# Patient Record
Sex: FEMALE | Race: WHITE | NOT HISPANIC OR LATINO | URBAN - METROPOLITAN AREA
[De-identification: names, ages, dates, MRNs, and addresses within clinical notes are randomized per-mention and may not be internally consistent; named-entity substitution may affect disease eponyms.]

---

## 2020-06-10 ENCOUNTER — OFFICE VISIT (OUTPATIENT)
Dept: URBAN - METROPOLITAN AREA CLINIC 113 | Facility: CLINIC | Age: 80
End: 2020-06-10

## 2020-07-25 ENCOUNTER — TELEPHONE ENCOUNTER (OUTPATIENT)
Dept: URBAN - METROPOLITAN AREA CLINIC 13 | Facility: CLINIC | Age: 80
End: 2020-07-25

## 2020-07-25 RX ORDER — BIFIDOBACTERIUM LONGUM 10MM CELL
TAKE 1 CAPSULE DAILY CAPSULE ORAL
Refills: 0 | OUTPATIENT
Start: 2011-03-15 | End: 2011-04-25

## 2020-07-25 RX ORDER — PRAVASTATIN SODIUM 20 MG/1
TAKE 1 TABLET DAILY TABLET ORAL
Refills: 0 | OUTPATIENT
End: 2015-04-09

## 2020-07-25 RX ORDER — DICYCLOMINE HYDROCHLORIDE 10 MG/1
TAKE 1 CAPSULE EVERY 6 HOURS PRN ABDOMINAL PAIN CAPSULE ORAL
Qty: 60 | Refills: 3 | OUTPATIENT
Start: 2016-04-01 | End: 2018-04-11

## 2020-07-25 RX ORDER — CALCIUM CARBONATE 500 MG/1
TAKE  TABLET AS NEEDED PRN TABLET, CHEWABLE ORAL
Refills: 0 | OUTPATIENT
Start: 2009-05-11 | End: 2011-03-09

## 2020-07-25 RX ORDER — HYDROCODONE BITARTRATE AND ACETAMINOPHEN 5; 325 MG/1; MG/1
TABLET ORAL
Qty: 45 | Refills: 0 | OUTPATIENT
Start: 2014-11-18 | End: 2015-09-14

## 2020-07-25 RX ORDER — OMEPRAZOLE 40 MG/1
TAKE ONE CAPSULE BY MOUTH EVERY DAY CAPSULE, DELAYED RELEASE ORAL
Qty: 30 | Refills: 6 | OUTPATIENT
Start: 2019-02-18 | End: 2019-06-27

## 2020-07-25 RX ORDER — POLYETHYLENE GLYCOL 3350, SODIUM CHLORIDE, SODIUM BICARBONATE AND POTASSIUM CHLORIDE WITH LEMON FLAVOR 420; 11.2; 5.72; 1.48 G/4L; G/4L; G/4L; G/4L
TAKE 1/2 GALLON AT 5:00 PM DAY BEFORE PROCEDURE, TAKE SECOND 1/2 OF GALLON 6 HRS PRIOR TO PROCEDURE POWDER, FOR SOLUTION ORAL
Qty: 1 | Refills: 0 | OUTPATIENT
Start: 2018-09-24 | End: 2018-10-08

## 2020-07-25 RX ORDER — ALLOPURINOL 300 MG/1
TAKE 1 TABLET DAILY AS DIRECTED TABLET ORAL
Refills: 0 | OUTPATIENT
End: 2018-07-03

## 2020-07-25 RX ORDER — PRAVASTATIN SODIUM 40 MG/1
TAKE 1 TABLET DAILY TABLET ORAL
Refills: 0 | OUTPATIENT
Start: 2014-05-08 | End: 2019-02-18

## 2020-07-25 RX ORDER — CIPROFLOXACIN HCL 250 MG
TAKE 1 TABLET TWICE DAILY TABLET ORAL
Qty: 20 | Refills: 1 | OUTPATIENT
End: 2018-10-08

## 2020-07-25 RX ORDER — ONDANSETRON 4 MG/1
TAKE 1 TABLET EVERY 8 HOURS PRN NAUSEA TABLET, ORALLY DISINTEGRATING ORAL
Qty: 20 | Refills: 1 | OUTPATIENT
Start: 2018-03-20 | End: 2018-04-16

## 2020-07-25 RX ORDER — DONEPEZIL HYDROCHLORIDE 10 MG/1
TAKE 1 TABLET DAILY AS DIRECTED TABLET, FILM COATED ORAL
Refills: 0 | OUTPATIENT
Start: 2019-05-21 | End: 2020-06-10

## 2020-07-25 RX ORDER — CLONAZEPAM 0.5 MG/1
TAKE 1/2 TO 1 TABLET AT BEDTIME AS NEEDED TABLET ORAL
Refills: 0 | OUTPATIENT
End: 2019-02-18

## 2020-07-25 RX ORDER — DICYCLOMINE HYDROCHLORIDE 10 MG/1
TAKE 1 CAPSULE EVERY 6 HOURS PRN ABDOMINAL PAIN CAPSULE ORAL
Qty: 60 | Refills: 3 | OUTPATIENT
Start: 2018-05-14 | End: 2019-02-18

## 2020-07-25 RX ORDER — CIPROFLOXACIN HYDROCHLORIDE 500 MG/1
TAKE 1 TABLET TWICE DAILY FOR 10 DAYS TABLET, FILM COATED ORAL
Qty: 20 | Refills: 0 | OUTPATIENT
End: 2019-06-27

## 2020-07-25 RX ORDER — CLONAZEPAM 0.5 MG/1
TAKE 1 TABLET 3 TIMES DAILY AS NEEDED TABLET ORAL
Refills: 0 | OUTPATIENT
Start: 2014-05-08 | End: 2018-03-20

## 2020-07-25 RX ORDER — OMEPRAZOLE 40 MG/1
TAKE (1) CAPSULE BY MOUTH DAILY CAPSULE, DELAYED RELEASE ORAL
Qty: 30 | Refills: 3 | OUTPATIENT
Start: 2015-04-14 | End: 2015-09-14

## 2020-07-25 RX ORDER — COLESEVELAM HYDROCHLORIDE 625 MG/1
TAKE 1 TABLET DAILY TABLET, FILM COATED ORAL
Qty: 30 | Refills: 3 | OUTPATIENT
Start: 2018-05-14 | End: 2019-02-18

## 2020-07-25 RX ORDER — BUSPIRONE HYDROCHLORIDE 5 MG/1
TAKE 1 TABLET TWICE DAILY TABLET ORAL
Qty: 60 | Refills: 3 | OUTPATIENT
Start: 2018-05-14 | End: 2019-02-18

## 2020-07-25 RX ORDER — ONDANSETRON 4 MG/1
TAKE 1 TABLET EVERY 8 HOURS PRN NAUSEA TABLET, ORALLY DISINTEGRATING ORAL
Qty: 28 | Refills: 3 | OUTPATIENT
Start: 2019-09-26 | End: 2020-06-10

## 2020-07-25 RX ORDER — METRONIDAZOLE 500 MG/1
TAKE 1 TABLET 3 TIMES DAILY FOR 10 DAYS TABLET ORAL
Qty: 30 | Refills: 0 | OUTPATIENT
Start: 2014-10-17 | End: 2014-11-13

## 2020-07-26 ENCOUNTER — TELEPHONE ENCOUNTER (OUTPATIENT)
Dept: URBAN - METROPOLITAN AREA CLINIC 13 | Facility: CLINIC | Age: 80
End: 2020-07-26

## 2020-07-26 RX ORDER — TRAMADOL HYDROCHLORIDE 50 MG/1
TABLET ORAL
Qty: 30 | Refills: 0 | Status: ACTIVE | COMMUNITY
Start: 2018-04-21

## 2020-07-26 RX ORDER — COLESEVELAM HYDROCHLORIDE 625 MG/1
TABLET, FILM COATED ORAL
Qty: 60 | Refills: 0 | Status: ACTIVE | COMMUNITY
Start: 2015-02-23

## 2020-07-26 RX ORDER — TRIAMCINOLONE ACETONIDE 1 MG/G
CREAM TOPICAL
Qty: 30 | Refills: 0 | Status: ACTIVE | COMMUNITY
Start: 2014-06-12

## 2020-07-26 RX ORDER — FLUCONAZOLE 150 MG/1
TABLET ORAL
Qty: 2 | Refills: 0 | Status: ACTIVE | COMMUNITY
Start: 2018-09-13

## 2020-07-26 RX ORDER — ROSUVASTATIN CALCIUM 10 MG/1
TABLET, FILM COATED ORAL
Qty: 90 | Refills: 0 | Status: ACTIVE | COMMUNITY
Start: 2018-10-30

## 2020-07-26 RX ORDER — SUCRALFATE 1 G/10ML
SUSPENSION ORAL
Qty: 600 | Refills: 0 | Status: ACTIVE | COMMUNITY
Start: 2019-08-08

## 2020-07-26 RX ORDER — AMOXICILLIN 500 MG/1
CAPSULE ORAL
Qty: 56 | Refills: 0 | Status: ACTIVE | COMMUNITY
Start: 2018-03-12

## 2020-07-26 RX ORDER — CIPROFLOXACIN HYDROCHLORIDE 500 MG/1
TABLET, FILM COATED ORAL
Qty: 20 | Refills: 0 | Status: ACTIVE | COMMUNITY
Start: 2014-02-14

## 2020-07-26 RX ORDER — CIPROFLOXACIN HYDROCHLORIDE 500 MG/1
TABLET, FILM COATED ORAL
Qty: 20 | Refills: 0 | Status: ACTIVE | COMMUNITY
Start: 2018-02-27

## 2020-07-26 RX ORDER — AMLODIPINE BESYLATE 2.5 MG/1
TABLET ORAL
Qty: 30 | Refills: 0 | Status: ACTIVE | COMMUNITY
Start: 2019-07-25

## 2020-07-26 RX ORDER — FLUCONAZOLE 150 MG/1
TABLET ORAL
Qty: 7 | Refills: 0 | Status: ACTIVE | COMMUNITY
Start: 2014-08-25

## 2020-07-26 RX ORDER — ALLOPURINOL 100 MG/1
TABLET ORAL
Qty: 180 | Refills: 0 | Status: ACTIVE | COMMUNITY
Start: 2018-03-06

## 2020-07-26 RX ORDER — OMEPRAZOLE 20 MG/1
CAPSULE, DELAYED RELEASE ORAL
Qty: 30 | Refills: 0 | Status: ACTIVE | COMMUNITY
Start: 2018-05-08

## 2020-07-26 RX ORDER — NITROFURANTOIN MONOHYDRATE/MACROCRYSTALLINE 25; 75 MG/1; MG/1
CAPSULE ORAL
Qty: 14 | Refills: 0 | Status: ACTIVE | COMMUNITY
Start: 2018-09-05

## 2020-07-26 RX ORDER — ALBUTEROL SULFATE 90 UG/1
AEROSOL, METERED RESPIRATORY (INHALATION)
Qty: 85 | Refills: 0 | Status: ACTIVE | COMMUNITY
Start: 2019-05-21

## 2020-07-26 RX ORDER — PRAVASTATIN SODIUM 40 MG/1
TABLET ORAL
Qty: 90 | Refills: 0 | Status: ACTIVE | COMMUNITY
Start: 2014-05-08

## 2020-07-26 RX ORDER — ASPIRIN 81 MG/1
TAKE 1 TABLET DAILY TABLET, DELAYED RELEASE ORAL
Refills: 0 | Status: ACTIVE | COMMUNITY

## 2020-07-26 RX ORDER — ONDANSETRON HYDROCHLORIDE 4 MG/1
TABLET, FILM COATED ORAL
Qty: 45 | Refills: 0 | Status: ACTIVE | COMMUNITY
Start: 2014-11-18

## 2020-07-26 RX ORDER — SUCRALFATE 1 G/1
TABLET ORAL
Qty: 90 | Refills: 0 | Status: ACTIVE | COMMUNITY
Start: 2018-03-06

## 2020-07-26 RX ORDER — TAMSULOSIN HYDROCHLORIDE 0.4 MG/1
CAPSULE ORAL
Qty: 90 | Refills: 0 | Status: ACTIVE | COMMUNITY
Start: 2018-10-16

## 2020-07-26 RX ORDER — PENICILLIN V POTASSIUM 500 MG/1
TABLET, FILM COATED ORAL
Qty: 28 | Refills: 0 | Status: ACTIVE | COMMUNITY
Start: 2018-09-27

## 2020-07-26 RX ORDER — CEPHALEXIN 500 MG/1
CAPSULE ORAL
Qty: 30 | Refills: 0 | Status: ACTIVE | COMMUNITY
Start: 2018-09-20

## 2020-07-26 RX ORDER — FLUCONAZOLE 100 MG/1
TABLET ORAL
Qty: 7 | Refills: 0 | Status: ACTIVE | COMMUNITY
Start: 2018-09-20

## 2020-07-26 RX ORDER — NITROFURANTOIN MONOHYDRATE/MACROCRYSTALLINE 25; 75 MG/1; MG/1
CAPSULE ORAL
Qty: 14 | Refills: 0 | Status: ACTIVE | COMMUNITY
Start: 2019-04-15

## 2020-07-26 RX ORDER — HYDROCODONE BITARTRATE AND ACETAMINOPHEN 5; 325 MG/1; MG/1
TABLET ORAL
Qty: 45 | Refills: 0 | Status: ACTIVE | COMMUNITY
Start: 2014-11-18

## 2020-07-26 RX ORDER — BETHANECHOL CHLORIDE 25 MG/1
TABLET ORAL
Qty: 60 | Refills: 0 | Status: ACTIVE | COMMUNITY
Start: 2014-10-02

## 2020-07-26 RX ORDER — GABAPENTIN 100 MG/1
CAPSULE ORAL
Qty: 30 | Refills: 0 | Status: ACTIVE | COMMUNITY
Start: 2018-05-17

## 2020-07-26 RX ORDER — BUSPIRONE HYDROCHLORIDE 5 MG/1
TAKE 1 TABLET TWICE DAILY TABLET ORAL
Qty: 60 | Refills: 5 | Status: ACTIVE | COMMUNITY
Start: 2019-09-26

## 2020-07-26 RX ORDER — QUETIAPINE FUMARATE 25 MG/1
TABLET, FILM COATED ORAL
Qty: 30 | Refills: 0 | Status: ACTIVE | COMMUNITY
Start: 2018-12-04

## 2020-07-26 RX ORDER — CLARITHROMYCIN 500 MG/1
TABLET, FILM COATED ORAL
Qty: 28 | Refills: 0 | Status: ACTIVE | COMMUNITY
Start: 2018-03-12

## 2020-07-26 RX ORDER — ONDANSETRON HYDROCHLORIDE 4 MG/1
TABLET, FILM COATED ORAL
Qty: 15 | Refills: 0 | Status: ACTIVE | COMMUNITY
Start: 2018-03-27

## 2020-07-26 RX ORDER — QUETIAPINE FUMARATE 25 MG/1
TABLET, FILM COATED ORAL
Qty: 90 | Refills: 0 | Status: ACTIVE | COMMUNITY
Start: 2019-01-03

## 2020-07-26 RX ORDER — CEPHALEXIN 500 MG/1
CAPSULE ORAL
Qty: 28 | Refills: 0 | Status: ACTIVE | COMMUNITY
Start: 2014-08-05

## 2020-07-26 RX ORDER — ATORVASTATIN CALCIUM 80 MG/1
TAKE 1 TABLET DAILY TABLET, FILM COATED ORAL
Refills: 0 | Status: ACTIVE | COMMUNITY
Start: 2018-12-04

## 2020-07-26 RX ORDER — CEPHALEXIN 250 MG/1
CAPSULE ORAL
Qty: 50 | Refills: 0 | Status: ACTIVE | COMMUNITY
Start: 2014-02-20

## 2020-07-26 RX ORDER — CLONAZEPAM 0.5 MG/1
TABLET ORAL
Qty: 60 | Refills: 0 | Status: ACTIVE | COMMUNITY
Start: 2014-01-08

## 2020-07-26 RX ORDER — AMOXICILLIN AND CLAVULANATE POTASSIUM 875; 125 MG/1; MG/1
TABLET, FILM COATED ORAL
Qty: 14 | Refills: 0 | Status: ACTIVE | COMMUNITY
Start: 2014-04-10

## 2020-07-26 RX ORDER — OMEPRAZOLE 20 MG/1
TAKE 1 CAPSULE BY MOUTH ONCE DAILY CAPSULE, DELAYED RELEASE ORAL
Qty: 30 | Refills: 0 | Status: ACTIVE | COMMUNITY
Start: 2019-08-08

## 2020-08-27 ENCOUNTER — OFFICE VISIT (OUTPATIENT)
Dept: URBAN - METROPOLITAN AREA CLINIC 113 | Facility: CLINIC | Age: 80
End: 2020-08-27
Payer: MEDICARE

## 2020-08-27 VITALS
SYSTOLIC BLOOD PRESSURE: 97 MMHG | TEMPERATURE: 97.2 F | HEIGHT: 66 IN | HEART RATE: 65 BPM | DIASTOLIC BLOOD PRESSURE: 59 MMHG | RESPIRATION RATE: 20 BRPM | BODY MASS INDEX: 22.5 KG/M2 | WEIGHT: 140 LBS

## 2020-08-27 DIAGNOSIS — R11.0 NAUSEA: ICD-10-CM

## 2020-08-27 PROCEDURE — 99213 OFFICE O/P EST LOW 20 MIN: CPT | Performed by: INTERNAL MEDICINE

## 2020-08-27 RX ORDER — ATORVASTATIN CALCIUM 80 MG/1
TAKE 1 TABLET DAILY TABLET, FILM COATED ORAL
Refills: 0 | Status: ACTIVE | COMMUNITY
Start: 2018-12-04

## 2020-08-27 RX ORDER — ALBUTEROL SULFATE 90 UG/1
AEROSOL, METERED RESPIRATORY (INHALATION)
Qty: 85 | Refills: 0 | Status: ACTIVE | COMMUNITY
Start: 2019-05-21

## 2020-08-27 RX ORDER — QUETIAPINE FUMARATE 25 MG/1
TABLET, FILM COATED ORAL
Qty: 30 | Refills: 0 | Status: ACTIVE | COMMUNITY
Start: 2018-12-04

## 2020-08-27 RX ORDER — BUSPIRONE HYDROCHLORIDE 5 MG/1
TAKE 1 TABLET TWICE DAILY TABLET ORAL
Qty: 60 | Refills: 5 | Status: ACTIVE | COMMUNITY
Start: 2019-09-26

## 2020-08-27 RX ORDER — ASPIRIN 81 MG/1
TAKE 1 TABLET DAILY TABLET, DELAYED RELEASE ORAL
Refills: 0 | Status: ACTIVE | COMMUNITY

## 2020-08-27 RX ORDER — ONDANSETRON HYDROCHLORIDE 8 MG/1
1 CAPSULE TABLET, FILM COATED ORAL
Qty: 90 | Refills: 0 | OUTPATIENT
Start: 2020-08-27 | End: 2020-09-26

## 2020-08-27 RX ORDER — TAMSULOSIN HYDROCHLORIDE 0.4 MG/1
CAPSULE ORAL
Qty: 90 | Refills: 0 | Status: ACTIVE | COMMUNITY
Start: 2018-10-16

## 2020-08-27 RX ORDER — ROSUVASTATIN CALCIUM 10 MG/1
TABLET, FILM COATED ORAL
Qty: 90 | Refills: 0 | Status: ACTIVE | COMMUNITY
Start: 2018-10-30

## 2020-08-27 RX ORDER — ALLOPURINOL 100 MG/1
1 TABLET TABLET ORAL ONCE A DAY
Refills: 0 | Status: ACTIVE | COMMUNITY
Start: 2018-03-06

## 2020-08-27 RX ORDER — SUCRALFATE 1 G/1
TABLET ORAL
Qty: 90 | Refills: 0 | Status: ACTIVE | COMMUNITY
Start: 2018-03-06

## 2020-08-27 RX ORDER — AMLODIPINE BESYLATE 2.5 MG/1
1 TABLET TABLET ORAL ONCE A DAY
Refills: 0 | Status: ACTIVE | COMMUNITY
Start: 2019-07-25

## 2020-08-27 NOTE — HPI-TODAY'S VISIT:
Ms. DUENAS is a 80 year old female with a history of colon cancer status post partial colectomy in 2003, GERD, constipation, nausea, and chronic abdominal pain, presenting for follow-up.  She was previously seen in Septmeber 2019 for follow-up regarding nausea. She was consistently taking omeprazole, and her PCP had added Carafate before meals. Morning nausea was persistent, improving and resolving after she was up moving about. She is to continue omeprazole, Zofran as needed and was to begin a trial of BuSpar. 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 Repeat MRI in 12 months to ensure stability.  At the time of her last visit here on 6/10/20, she continued with morning nausea that was persistent through lunch time. She could not do "anything" while the nausea is occurring. She reported upper abdominal discomfort described as a "hurt." Nausea seemed to improve with activity. She did not try buspirone as previously recommended. We recommended that she try this at the time of her last visit, along with FD Guard as needed.  The patient took the BuSpar after her last visit, but stopped it because "it did not help."  She continues to complain of nausea.  Typically, she stays in bed until after dinner every day.  She also complains of anhedonia, and feels as though she is going to die.  She has repetitively expressed concerns to her  that she will not live long enough to see her currently pregnant granddaughter give birth to her first great-granddaughter.  She recently had a worsening of her nausea after taking some Septra DS given to her for a urinary tract infection by Dr. Bland of urology.  She denies fever, chills, or sweats.  She actually has not had vomiting except in the context of taking the Septra.  She is convinced that her Synthroid is causing nausea, although she claims to have nausea constantly, and so she has not been taking her Synthroid.  Upon further questioning, the patient admits to depression, stating that this depression is profound.  Her , who accompanies her today, agrees.   Her weight is stable since her last visit in June 2020, although it is down 10 lbs over the past year.   Her last EGD was on 4/16/18 for nausea and epigastric pain and was normal.  Her last colonoscopy was on 9/26/18 and showed an intact anastomosis and left colon diverticulosis, but was otherwise normal.

## 2020-08-28 ENCOUNTER — TELEPHONE ENCOUNTER (OUTPATIENT)
Dept: URBAN - METROPOLITAN AREA CLINIC 113 | Facility: CLINIC | Age: 80
End: 2020-08-28

## 2020-08-28 ENCOUNTER — LAB OUTSIDE AN ENCOUNTER (OUTPATIENT)
Dept: URBAN - METROPOLITAN AREA CLINIC 113 | Facility: CLINIC | Age: 80
End: 2020-08-28

## 2020-09-16 ENCOUNTER — CLAIMS CREATED FROM THE CLAIM WINDOW (OUTPATIENT)
Dept: URBAN - METROPOLITAN AREA CLINIC 4 | Facility: CLINIC | Age: 80
End: 2020-09-16
Payer: MEDICARE

## 2020-09-16 ENCOUNTER — OFFICE VISIT (OUTPATIENT)
Dept: URBAN - METROPOLITAN AREA SURGERY CENTER 25 | Facility: SURGERY CENTER | Age: 80
End: 2020-09-16
Payer: MEDICARE

## 2020-09-16 DIAGNOSIS — R11.0 CHRONIC NAUSEA: ICD-10-CM

## 2020-09-16 DIAGNOSIS — K31.89 OTHER DISEASES OF STOMACH AND DUODENUM: ICD-10-CM

## 2020-09-16 PROCEDURE — G8907 PT DOC NO EVENTS ON DISCHARG: HCPCS | Performed by: INTERNAL MEDICINE

## 2020-09-16 PROCEDURE — 88312 SPECIAL STAINS GROUP 1: CPT | Performed by: PATHOLOGY

## 2020-09-16 PROCEDURE — 88305 TISSUE EXAM BY PATHOLOGIST: CPT | Performed by: PATHOLOGY

## 2020-09-16 PROCEDURE — 43239 EGD BIOPSY SINGLE/MULTIPLE: CPT | Performed by: INTERNAL MEDICINE

## 2020-09-16 RX ORDER — SUCRALFATE 1 G/1
TABLET ORAL
Qty: 90 | Refills: 0 | Status: ACTIVE | COMMUNITY
Start: 2018-03-06

## 2020-09-16 RX ORDER — ALBUTEROL SULFATE 90 UG/1
AEROSOL, METERED RESPIRATORY (INHALATION)
Qty: 85 | Refills: 0 | Status: ACTIVE | COMMUNITY
Start: 2019-05-21

## 2020-09-16 RX ORDER — TAMSULOSIN HYDROCHLORIDE 0.4 MG/1
CAPSULE ORAL
Qty: 90 | Refills: 0 | Status: ACTIVE | COMMUNITY
Start: 2018-10-16

## 2020-09-16 RX ORDER — ROSUVASTATIN CALCIUM 10 MG/1
TABLET, FILM COATED ORAL
Qty: 90 | Refills: 0 | Status: ACTIVE | COMMUNITY
Start: 2018-10-30

## 2020-09-16 RX ORDER — ASPIRIN 81 MG/1
TAKE 1 TABLET DAILY TABLET, DELAYED RELEASE ORAL
Refills: 0 | Status: ACTIVE | COMMUNITY

## 2020-09-16 RX ORDER — AMLODIPINE BESYLATE 2.5 MG/1
1 TABLET TABLET ORAL ONCE A DAY
Refills: 0 | Status: ACTIVE | COMMUNITY
Start: 2019-07-25

## 2020-09-16 RX ORDER — BUSPIRONE HYDROCHLORIDE 5 MG/1
TAKE 1 TABLET TWICE DAILY TABLET ORAL
Qty: 60 | Refills: 5 | Status: ACTIVE | COMMUNITY
Start: 2019-09-26

## 2020-09-16 RX ORDER — ALLOPURINOL 100 MG/1
1 TABLET TABLET ORAL ONCE A DAY
Refills: 0 | Status: ACTIVE | COMMUNITY
Start: 2018-03-06

## 2020-09-16 RX ORDER — ATORVASTATIN CALCIUM 80 MG/1
TAKE 1 TABLET DAILY TABLET, FILM COATED ORAL
Refills: 0 | Status: ACTIVE | COMMUNITY
Start: 2018-12-04

## 2020-09-16 RX ORDER — QUETIAPINE FUMARATE 25 MG/1
TABLET, FILM COATED ORAL
Qty: 30 | Refills: 0 | Status: ACTIVE | COMMUNITY
Start: 2018-12-04

## 2020-09-16 RX ORDER — ONDANSETRON HYDROCHLORIDE 8 MG/1
1 CAPSULE TABLET, FILM COATED ORAL
Qty: 90 | Refills: 0 | Status: ACTIVE | COMMUNITY
Start: 2020-08-27 | End: 2020-09-26

## 2020-12-08 ENCOUNTER — OFFICE VISIT (OUTPATIENT)
Dept: URBAN - METROPOLITAN AREA CLINIC 113 | Facility: CLINIC | Age: 80
End: 2020-12-08
Payer: MEDICARE

## 2020-12-08 VITALS
HEIGHT: 66 IN | RESPIRATION RATE: 20 BRPM | BODY MASS INDEX: 22.18 KG/M2 | HEART RATE: 66 BPM | TEMPERATURE: 98 F | DIASTOLIC BLOOD PRESSURE: 70 MMHG | WEIGHT: 138 LBS | SYSTOLIC BLOOD PRESSURE: 131 MMHG

## 2020-12-08 DIAGNOSIS — R93.5 ABNORMAL MRI OF ABDOMEN: ICD-10-CM

## 2020-12-08 DIAGNOSIS — K31.89 OTHER DISEASES OF STOMACH AND DUODENUM: ICD-10-CM

## 2020-12-08 PROCEDURE — 99213 OFFICE O/P EST LOW 20 MIN: CPT | Performed by: INTERNAL MEDICINE

## 2020-12-08 RX ORDER — ALBUTEROL SULFATE 90 UG/1
AEROSOL, METERED RESPIRATORY (INHALATION)
Qty: 85 | Refills: 0 | Status: ACTIVE | COMMUNITY
Start: 2019-05-21

## 2020-12-08 RX ORDER — LEVOTHYROXINE SODIUM 0.03 MG/1
1 TABLET IN THE MORNING ON AN EMPTY STOMACH TABLET ORAL ONCE A DAY
Status: ACTIVE | COMMUNITY

## 2020-12-08 RX ORDER — ATORVASTATIN CALCIUM 80 MG/1
TAKE 1 TABLET DAILY TABLET, FILM COATED ORAL
Refills: 0 | Status: ACTIVE | COMMUNITY
Start: 2018-12-04

## 2020-12-08 RX ORDER — ASPIRIN 81 MG/1
TAKE 1 TABLET DAILY TABLET, DELAYED RELEASE ORAL
Refills: 0 | Status: ACTIVE | COMMUNITY

## 2020-12-08 RX ORDER — CIPROFLOXACIN HYDROCHLORIDE 250 MG/1
1 TABLET TABLET, FILM COATED ORAL DAILY
Status: ACTIVE | COMMUNITY

## 2020-12-08 NOTE — HPI-TODAY'S VISIT:
Ms. DUENAS is a 80 year old female with a history of colon cancer status post partial colectomy in 2003, GERD, constipation, nausea, and chronic abdominal pain, presenting for follow-up. She was previously seen in September 2019 for follow-up regarding nausea. She was consistently taking omeprazole, and her PCP had added Carafate before meals. Morning nausea was persistent, improving and resolving after she was up moving about. She is to continue omeprazole, Zofran as needed and was to begin a trial of BuSpar. 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 Repeat MRI in 12 months to ensure stability. At the time of her visit here on 6/10/20, she continued with morning nausea that was persistent through lunch time. She could not do "anything" while the nausea is occurring. She reported upper abdominal discomfort described as a "hurt." Nausea seemed to improve with activity. She did not try buspirone as previously recommended. We recommended that she try this at the time of her last visit, along with FD Guard as needed. The patient took the BuSpar after her last visit, but stopped it because "it did not help."  At her last visit on 8/27/20, she continued to complain of nausea.  Typically, she would stays in bed until after dinner every day.  She also complained of anhedonia, and felt as though she was going to die.  She has repetitively expressed concerns to her  that she will not live long enough to see her currently pregnant granddaughter give birth to her first great-granddaughter.  She had noted worsening nausea after taking some Septra DS given to her for a urinary tract infection by Dr. Bland of urology.  She denied fever, chills, or sweats.  She actually has not had vomiting except in the context of taking the Septra.  She was convinced that her Synthroid was causing nausea, although she claimed to have nausea constantly, and so she has not been taking her Synthroid.  Upon further questioning, the patient admits to depression, stating that this depression is profound.  Her , who accompanied her to her last visit, agreed.   After her last visit, she was placed on an antidepressant by Dr. Roger Buckner, her PCP, and underwent an EGD on 9/16/20 which showed a small hiatal hernia and antral gastritis. H pylori bx were negative for any pathology.   Since her last visit, she is less depressed although she still has some depression.  Her  feels that she is doing "great."  She has been having some problems with dysuria because of a persistent urinary tract infection being followed by Dr. Bland.  However, her appetite is improved, and her depression seems to be lifting somewhat per her .  Her last prior EGD had been on 4/16/18 for c/o nausea and epigastric pain and was normal.  Her last colonoscopy was on 9/26/18 and showed an intact anastomosis and left colon diverticulosis, but was otherwise normal.

## 2021-07-14 ENCOUNTER — OFFICE VISIT (OUTPATIENT)
Dept: URBAN - METROPOLITAN AREA CLINIC 113 | Facility: CLINIC | Age: 81
End: 2021-07-14
Payer: MEDICARE

## 2021-07-14 ENCOUNTER — DASHBOARD ENCOUNTERS (OUTPATIENT)
Age: 81
End: 2021-07-14

## 2021-07-14 VITALS
HEIGHT: 66 IN | SYSTOLIC BLOOD PRESSURE: 146 MMHG | DIASTOLIC BLOOD PRESSURE: 74 MMHG | TEMPERATURE: 98.4 F | WEIGHT: 134 LBS | HEART RATE: 77 BPM | BODY MASS INDEX: 21.53 KG/M2

## 2021-07-14 DIAGNOSIS — R93.5 ABNORMAL MRI OF ABDOMEN: ICD-10-CM

## 2021-07-14 DIAGNOSIS — K59.01 CONSTIPATION BY DELAYED COLONIC TRANSIT: ICD-10-CM

## 2021-07-14 DIAGNOSIS — Z85.038 HISTORY OF COLON CANCER: ICD-10-CM

## 2021-07-14 DIAGNOSIS — R11.0 NAUSEA: ICD-10-CM

## 2021-07-14 PROCEDURE — 99213 OFFICE O/P EST LOW 20 MIN: CPT | Performed by: INTERNAL MEDICINE

## 2021-07-14 RX ORDER — LEVOTHYROXINE SODIUM 0.03 MG/1
1 TABLET IN THE MORNING ON AN EMPTY STOMACH TABLET ORAL ONCE A DAY
Status: ACTIVE | COMMUNITY

## 2021-07-14 RX ORDER — MIRTAZAPINE 15 MG/1
1 TABLET AT BEDTIME TABLET, FILM COATED ORAL ONCE A DAY
Status: ACTIVE | COMMUNITY

## 2021-07-14 RX ORDER — ASPIRIN 81 MG/1
TAKE 1 TABLET DAILY TABLET, DELAYED RELEASE ORAL
Refills: 0 | Status: ACTIVE | COMMUNITY

## 2021-07-14 RX ORDER — ALBUTEROL SULFATE 90 UG/1
AEROSOL, METERED RESPIRATORY (INHALATION)
Qty: 85 | Refills: 0 | Status: DISCONTINUED | COMMUNITY
Start: 2019-05-21

## 2021-07-14 RX ORDER — ONDANSETRON 4 MG/1
1 TABLET ON THE TONGUE AND ALLOW TO DISSOLVE TABLET, ORALLY DISINTEGRATING ORAL ONCE A DAY
Status: ACTIVE | COMMUNITY

## 2021-07-14 RX ORDER — CIPROFLOXACIN HYDROCHLORIDE 250 MG/1
1 TABLET TABLET, FILM COATED ORAL DAILY
Status: DISCONTINUED | COMMUNITY

## 2021-07-14 RX ORDER — ATORVASTATIN CALCIUM 80 MG/1
TAKE 1 TABLET DAILY TABLET, FILM COATED ORAL
Refills: 0 | Status: DISCONTINUED | COMMUNITY
Start: 2018-12-04

## 2021-07-14 NOTE — HPI-TODAY'S VISIT:
Ms. DUENAS is a 81 year old female with a history of colon cancer status post partial colectomy in 2003, GERD, constipation, nausea, and chronic abdominal pain, presenting for follow-up. She was previously seen in September 2019 for follow-up regarding nausea. She was consistently taking omeprazole, and her PCP had added Carafate before meals. Morning nausea was persistent, improving and resolving after she was up moving about. She is to continue omeprazole, Zofran as needed and was to begin a trial of BuSpar. 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 Repeat MRI in 12 months to ensure stability. At the time of her visit here on 6/10/20, she continued with morning nausea that was persistent through lunch time. She could not do "anything" while the nausea is occurring. She reported upper abdominal discomfort described as a "hurt." Nausea seemed to improve with activity. She did not try buspirone as previously recommended. We recommended that she try this at the time of her last visit, along with FD Guard as needed. The patient took the BuSpar after her last visit, but stopped it because "it did not help."  At her visit on 8/27/20, she continued to complain of nausea.  Typically, she would stays in bed until after dinner every day.  She also complained of anhedonia, and felt as though she was going to die.  She has repetitively expressed concerns to her  that she will not live long enough to see her currently pregnant granddaughter give birth to her first great-granddaughter.  She had noted worsening nausea after taking some Septra DS given to her for a urinary tract infection by Dr. Bland of urology.  She denied fever, chills, or sweats.  She actually has not had vomiting except in the context of taking the Septra.  She was convinced that her Synthroid was causing nausea, although she claimed to have nausea constantly, and so she has not been taking her Synthroid.  Upon further questioning, the patient admitted to depression, stating in addition that this depression was profound.  Her , who accompanied her to her last visit, agreed.   After that 8/2020 visit, she was placed on an antidepressant by Dr. Roger Buckner, her PCP, and underwent an EGD on 9/16/20 which showed a small hiatal hernia and antral gastritis. H pylori bx were negative for any pathology.   When she was last seen here on December 8, 2020, she was less depressed although she still had some depression.  Her  felt that she was doing "great."  She had been having some problems with dysuria because of a persistent urinary tract infection being followed by Dr. Bland.  However, her appetite was improved and she generally felt better overall.  She returns today with ongoing problems with anxiety, as well as memory issues, etc.  She has continued to have problems with recurrent urinary tract infections and has been treated chronically by Dr. Bland with nitrofurantoin.  Her , who accompanies her, tells me that she is "fixated" on her bladder issues. She is paranoid about medications and refuses to take them, including her Synthroid.  As a result, she is noncompliant.  She is frequently constipated, and milk of magnesia helps this, but she often will not take it. Nausea has not been an issue recently.  Notably, the patient had a repeat MRI of the pancreas on 12/22/20 showing persistent small cystic changes in the pancreatic parenchyma.  These were felt to be most consistent with side branch IPMNs.  There has not been significant change since 2019.  Her weight is down 4 pounds since her last visit.  There has been no intentional weight loss.  Her last colonoscopy was on 9/26/18 and showed an intact anastomosis and left colon diverticulosis, but was otherwise normal.

## 2021-07-24 PROBLEM — 35298007: Status: ACTIVE | Noted: 2021-07-24

## 2021-07-24 PROBLEM — 429699009: Status: ACTIVE | Noted: 2021-07-24
